# Patient Record
Sex: MALE | Race: WHITE | Employment: FULL TIME | URBAN - METROPOLITAN AREA
[De-identification: names, ages, dates, MRNs, and addresses within clinical notes are randomized per-mention and may not be internally consistent; named-entity substitution may affect disease eponyms.]

---

## 2021-11-03 ENCOUNTER — HOSPITAL ENCOUNTER (INPATIENT)
Facility: HOSPITAL | Age: 51
LOS: 7 days | Discharge: HOME HEALTH | DRG: 392 | End: 2021-11-10
Attending: EMERGENCY MEDICINE | Admitting: INTERNAL MEDICINE
Payer: COMMERCIAL

## 2021-11-03 ENCOUNTER — APPOINTMENT (OUTPATIENT)
Dept: CT IMAGING | Facility: HOSPITAL | Age: 51
DRG: 392 | End: 2021-11-03
Attending: EMERGENCY MEDICINE
Payer: COMMERCIAL

## 2021-11-03 DIAGNOSIS — K57.20 DIVERTICULITIS OF COLON WITH PERFORATION: Primary | ICD-10-CM

## 2021-11-03 PROCEDURE — 99254 IP/OBS CNSLTJ NEW/EST MOD 60: CPT | Performed by: SURGERY

## 2021-11-03 PROCEDURE — 74177 CT ABD & PELVIS W/CONTRAST: CPT | Performed by: EMERGENCY MEDICINE

## 2021-11-03 RX ORDER — KETOROLAC TROMETHAMINE 30 MG/ML
30 INJECTION, SOLUTION INTRAMUSCULAR; INTRAVENOUS ONCE
Status: COMPLETED | OUTPATIENT
Start: 2021-11-03 | End: 2021-11-03

## 2021-11-03 RX ORDER — HYDRALAZINE HYDROCHLORIDE 20 MG/ML
10 INJECTION INTRAMUSCULAR; INTRAVENOUS EVERY 6 HOURS PRN
Status: DISCONTINUED | OUTPATIENT
Start: 2021-11-03 | End: 2021-11-11

## 2021-11-03 RX ORDER — ONDANSETRON 2 MG/ML
4 INJECTION INTRAMUSCULAR; INTRAVENOUS EVERY 6 HOURS PRN
Status: DISCONTINUED | OUTPATIENT
Start: 2021-11-03 | End: 2021-11-11

## 2021-11-03 RX ORDER — DEXTROSE AND SODIUM CHLORIDE 5; .45 G/100ML; G/100ML
INJECTION, SOLUTION INTRAVENOUS CONTINUOUS
Status: DISCONTINUED | OUTPATIENT
Start: 2021-11-03 | End: 2021-11-10

## 2021-11-03 RX ORDER — MORPHINE SULFATE 4 MG/ML
4 INJECTION, SOLUTION INTRAMUSCULAR; INTRAVENOUS EVERY 4 HOURS PRN
Status: DISCONTINUED | OUTPATIENT
Start: 2021-11-03 | End: 2021-11-11

## 2021-11-03 NOTE — ED PROVIDER NOTES
Patient Seen in: [facility] AND [facility] Emergency Department      History   Patient presents with:  Abdomen/Flank Pain    Stated Complaint: Stomach Pain    Subjective:   HPI    64-year-old male with history of hypertension (has not taken medications in years) lower quadrant, no masses, bowel sounds normal  Neurological: Speech normal.  Moving extremities equally x4. Skin: warm and dry, no rashes.   Musculoskeletal: neck is supple non tender        Extremities are symmetrical, full range of motion  Psychiatric: colon with perforation  (primary encounter diagnosis)     Disposition:  Admit  11/3/2021  3:13 pm    Follow-up:  No follow-up provider specified. Medications Prescribed:  There are no discharge medications for this patient.                         [facility]

## 2021-11-03 NOTE — PLAN OF CARE
Problem: Patient Centered Care  Goal: Patient preferences are identified and integrated in the patient's plan of care  Description: Interventions:  - What would you like us to know as we care for you?   - Provide timely, complete, and accurate informatio Progressing  Goal: Maintains adequate nutritional intake (undernourished)  Description: INTERVENTIONS:  - Monitor percentage of each meal consumed  - Identify factors contributing to decreased intake, treat as appropriate  - Assist with meals as needed  -

## 2021-11-03 NOTE — ED QUICK NOTES
Orders for admission, patient is aware of plan and ready to go upstairs. Any questions, please call ED RN Shabbir Ortega  at extension [phone].    Type of COVID test sent: Rapid  COVID Suspicion level:  Low    Titratable drug(s) infusing: none  Rate: na    LOC at time

## 2021-11-03 NOTE — PROGRESS NOTES
[facility] Pharmacy Note: Antimicrobial Weight Based Dose Adjustment for: piperacillin/tazobactam (Maximilian Hargrove)    Akhil Cardoso is a 46year old patient who has been prescribed piperacillin/tazobactam (ZOSYN) 3.375 g x 1.       Wt Readings from Last 6 Encounters:  11/

## 2021-11-04 PROCEDURE — 99232 SBSQ HOSP IP/OBS MODERATE 35: CPT | Performed by: SURGERY

## 2021-11-04 NOTE — CONSULTS
[facility]    PATIENT'S NAME: Dayo Serrano   ATTENDING PHYSICIAN: Bruce Molina MD   CONSULTING PHYSICIAN: Oly Burroughs MD   PATIENT ACCOUNT#:   [id number]    LOCATION:  [facility] [address] #:   [id number]       DATE OF BIRTH:  0 MD  d: 11/04/2021 07:34:17  t: 11/04/2021 08:13:35  [facility] [id number]  /

## 2021-11-04 NOTE — PROGRESS NOTES
[facility]    Progress Note    Miehsa Wade Patient Status:  Inpatient    1970 MRN [id number]   Location [facility] 5SW/SE Attending Kodi Edwards MD   [facility] Day # 1 PCP No primary care provider on file.      Subjectiv be considered to exclude potential underlying malignancy. 2. Indeterminate hepatic lesion. Follow-up MRI of the abdomen with and without contrast is recommended for further assessment.   3. A right adrenal nodule is not completely characterized within the

## 2021-11-04 NOTE — PLAN OF CARE
Problem: Patient Centered Care  Goal: Patient preferences are identified and integrated in the patient's plan of care  Description: Interventions:  - What would you like us to know as we care for you?  From home with family  - Provide timely, complete, an Encourage food from home; allow for food preferences  - Enhance eating environment  Outcome: Progressing  Goal: Achieves appropriate nutritional intake (bariatric)  Description: INTERVENTIONS:  - Monitor for over-consumption  - Identify factors contributin

## 2021-11-04 NOTE — H&P
[facility]    History and Physical    Ronicn Severe Patient Status:  Inpatient    1970 MRN [id number]   Location [facility] 5SW/SE Attending Marianela Hopkins MD   Hosp Day # 0 PCP No primary care provider on file.      Date rhythm. Pulmonary/Chest: Effort normal and breath sounds normal.   Abdominal: Soft. Bowel sounds are normal. There is abdominal tenderness. Neurological: He is oriented to person, place, and time. Psychiatric: He has a normal mood and affect.

## 2021-11-04 NOTE — PAYOR COMM NOTE
--------------  ADMISSION REVIEW     Payor: [employer]OSS [employer]AS  Subscriber #:  [id number]  Authorization Number: Rosana Hernanrebecca    Admit date: 11/3/21  Admit time:  5:27 PM       REVIEW DOCUMENTATION:     ED Provider Notes      ED Provider Notes signed by Pernell Lyles equal and round no pallor or injection  ENT, Mouth: mucous membranes moist  Respiratory: there are no retractions, lungs are clear to auscultation  Cardiovascular: regular rate and rhythm  Gastrointestinal:  abdomen is soft with tenderness to the left lowe Discussed with Dr. Michelle Larry, medicine on-call. Also discussed with Dr. Rabia Ritchie, general surgery on-call.   Admission disposition: 11/3/2021  3:13 PM      Disposition and Plan     Clinical Impression:  Diverticulitis of colon with perforation  (primary encounte to admission. Review of Systems:     Constitutional: Negative. HENT: Negative. Respiratory: Negative. Cardiovascular: Negative. Gastrointestinal: Positive for abdominal pain. Endocrine: Negative. Musculoskeletal: Negative.     Skin: Ne hepatic lesion. Follow-up MRI of the abdomen with and without contrast is recommended for further assessment.   3. A right adrenal nodule is not completely characterized within the parameters of the single phase exam. This could also be better assessed on M Mucosa is dry. NECK:  Supple without lymphadenopathy. Trachea midline. LUNGS:  Clear to auscultation. HEART:  Regular rate and rhythm. ABDOMEN:  Soft. Tenderness in the left flank. No rebound or guarding.   EXTREMITIES:  Without clubbing, cyanosis, guarding     Results:            Lab Results   Component Value Date     WBC 10.4 11/04/2021     HGB 15.5 11/04/2021     HCT 44.7 11/04/2021     .0 11/04/2021     CREATSERUM 1.19 11/04/2021     BUN 16 11/04/2021      (L) 11/04/2021     K 4.2 11 11/3/2021 1426 Given 85 mL Intravenous Camden Ervin L      morphINE sulfate (PF) 4 MG/ML injection 4 mg     Date Action Dose Route User    11/4/2021 1311 Given 4 mg Intravenous Ariel Castaneda RN    11/4/2021 0708 Given 4 mg Intravenous Oscar Huntley RN

## 2021-11-04 NOTE — PROGRESS NOTES
[place] [facility]    Progress Note    Emelina Guzman Patient Status:  Inpatient    1970 MRN [id number]   Location [facility] 5SW/SE Attending Germania Sheehan MD   [address] Day # 1 PCP No primary care provider on file.      Assessment Focal short segment sigmoid colonic diverticulitis with associated micro perforation and extensive surrounding pericolonic inflammation. There is no evidence of mature, loculated, drainable pericolonic abscess formation.  Follow-up is recommended to documen

## 2021-11-04 NOTE — PLAN OF CARE
Problem: Patient Centered Care  Goal: Patient preferences are identified and integrated in the patient's plan of care  Description: Interventions:  - What would you like us to know as we care for you?  I am from home w family  - Provide timely, complete, routine/schedule  - Consider collaborating with pharmacy to review patient's medication profile  Outcome: Progressing  Goal: Maintains adequate nutritional intake (undernourished)  Description: INTERVENTIONS:  - Monitor percentage of each meal consumed  -

## 2021-11-04 NOTE — PROGRESS NOTES
[facility] Pharmacy Note: Antimicrobial Weight Based Dose Adjustment for: piperacillin/tazobactam (Hever Akers)    Braulio Amador is a 46year old patient who has been prescribed piperacillin/tazobactam (ZOSYN) 3.375 g every 6 hours.       Estimated Creatinine Clearance

## 2021-11-05 PROCEDURE — 99232 SBSQ HOSP IP/OBS MODERATE 35: CPT | Performed by: SURGERY

## 2021-11-05 RX ORDER — HYDRALAZINE HYDROCHLORIDE 20 MG/ML
10 INJECTION INTRAMUSCULAR; INTRAVENOUS ONCE
Status: COMPLETED | OUTPATIENT
Start: 2021-11-05 | End: 2021-11-05

## 2021-11-05 RX ORDER — AMLODIPINE BESYLATE 10 MG/1
10 TABLET ORAL DAILY
Status: DISCONTINUED | OUTPATIENT
Start: 2021-11-05 | End: 2021-11-11

## 2021-11-05 RX ORDER — ACETAMINOPHEN 325 MG/1
650 TABLET ORAL EVERY 6 HOURS PRN
Status: DISCONTINUED | OUTPATIENT
Start: 2021-11-05 | End: 2021-11-11

## 2021-11-05 NOTE — PROGRESS NOTES
[place] [facility] - [facility]    Progress Note    Melissa Severe Patient Status:  Inpatient    1970 MRN [id number]   Location [facility] 5SW/SE Attending Marianela Hopkins MD   [facility] Day # 2 PCP No primary care provider on file.      Subjectiv

## 2021-11-05 NOTE — DIETARY NOTE
Brief Nutrition Note         Pt screened at Nutrition risk for wt loss and poor po. Chart reviewed. Visited pt.    NPO x 2 days since admit, Now on Cl liq, taking 100% and tolerating well, denies N/V post prandial.   Pt reports poor po in the past 5 d

## 2021-11-05 NOTE — PLAN OF CARE
Problem: Patient Centered Care  Goal: Patient preferences are identified and integrated in the patient's plan of care  Description: Interventions:  - What would you like us to know as we care for you?  From home with family  - Provide timely, complete, an collaborating with pharmacy to review patient's medication profile  Outcome: Progressing  Goal: Maintains adequate nutritional intake (undernourished)  Description: INTERVENTIONS:  - Monitor percentage of each meal consumed  - Identify factors contributing

## 2021-11-05 NOTE — PROGRESS NOTES
[place] [facility] - [facility]    Progress Note    Akhil Cardoso Patient Status:  Inpatient    1970 MRN [id number]   Location [facility] 5SW/SE Attending Carlos Aguirre MD   [facility] Day # 2 PCP No primary care provider on file.      Assessment associated micro perforation and extensive surrounding pericolonic inflammation. There is no evidence of mature, loculated, drainable pericolonic abscess formation. Follow-up is recommended to document resolution.  Following resolution of acute symptomatolo

## 2021-11-06 PROCEDURE — 99231 SBSQ HOSP IP/OBS SF/LOW 25: CPT | Performed by: SURGERY

## 2021-11-06 RX ORDER — VANCOMYCIN HYDROCHLORIDE 125 MG/1
125 CAPSULE ORAL DAILY
Status: DISCONTINUED | OUTPATIENT
Start: 2021-11-06 | End: 2021-11-11

## 2021-11-06 NOTE — PLAN OF CARE
Patient is resting well overnight. Headache and nausea medication provided relief. No emesis. Discussed BP with , trending down. Call light within reach.      Problem: Patient Centered Care  Goal: Patient preferences are identified and integrated i effectiveness of GI medications  - Encourage mobilization and activity  - Obtain nutritional consult as needed  - Establish a toileting routine/schedule  - Consider collaborating with pharmacy to review patient's medication profile  Outcome: Progressing  G

## 2021-11-06 NOTE — PROGRESS NOTES
[facility]    Progress Note    Waldemar Banerjee Patient Status:  Inpatient    1970 MRN [id number]   Location [facility] 5SW/SE Attending Noelle Ellis MD   [facility] Day # 3 PCP No primary care provider on file.      Subjectiv

## 2021-11-06 NOTE — PROGRESS NOTES
[facility]    Progress Note    Cristofer Wang Patient Status:  Inpatient    1970 MRN [id number]   Location [facility] 5SW/SE Attending Donald Correia MD   [facility] Day # 3 PCP No primary care provider on file.      Assessment 11/04/2021    K 4.2 11/04/2021     11/04/2021    CO2 29.0 11/04/2021     (H) 11/04/2021    CA 9.4 11/04/2021       No results found.             Baldev Ibrahim MD  11/6/2021

## 2021-11-07 ENCOUNTER — APPOINTMENT (OUTPATIENT)
Dept: CT IMAGING | Facility: HOSPITAL | Age: 51
DRG: 392 | End: 2021-11-07
Attending: SURGERY
Payer: COMMERCIAL

## 2021-11-07 PROCEDURE — 74177 CT ABD & PELVIS W/CONTRAST: CPT | Performed by: SURGERY

## 2021-11-07 PROCEDURE — 99231 SBSQ HOSP IP/OBS SF/LOW 25: CPT | Performed by: SURGERY

## 2021-11-07 RX ORDER — LOSARTAN POTASSIUM 50 MG/1
50 TABLET ORAL DAILY
Status: DISCONTINUED | OUTPATIENT
Start: 2021-11-07 | End: 2021-11-11

## 2021-11-07 RX ORDER — ENOXAPARIN SODIUM 100 MG/ML
40 INJECTION SUBCUTANEOUS DAILY
Status: DISCONTINUED | OUTPATIENT
Start: 2021-11-07 | End: 2021-11-11

## 2021-11-07 NOTE — PROGRESS NOTES
[facility]    Progress Note    Hattie Wild Patient Status:  Inpatient    1970 MRN [id number]   Location [facility] 5SW/SE Attending Michelle Mitchell MD   [facility] Day # 4 PCP No primary care provider on file.      Assessment Exam: Afeb, mild HTN  Abd protuberant, some distention today, mildly tender LLQ, no diffuse peritonitis.     Results:     Lab Results   Component Value Date    WBC 10.4 11/04/2021    HGB 15.5 11/04/2021    HCT 44.7 11/04/2021    .0 11/04/2021

## 2021-11-07 NOTE — PROGRESS NOTES
[place] [facility] - [facility]    Progress Note    Francisco Vogt Patient Status:  Inpatient    1970 MRN [id number]   Location [facility] 5SW/SE Attending David Singh MD   [facility] Day # 4 PCP No primary care provider on file.      Subjectiv well-defined/drainable fluid collection/abscess. No significant free intraperitoneal air. There are punctate foci of gas along the posterior margin of the mid descending colon, some of which appear new or more conspicuous since prior.   These are favored

## 2021-11-07 NOTE — PLAN OF CARE
Problem: GASTROINTESTINAL - ADULT  Goal: Minimal or absence of nausea and vomiting  Description: INTERVENTIONS:  - Maintain adequate hydration with IV or PO as ordered and tolerated  - Nasogastric tube to low intermittent suction as ordered  - Evaluate e Monitor for opioid side effects  - Notify MD/LIP if interventions unsuccessful or patient reports new pain  - Anticipate increased pain with activity and pre-medicate as appropriate  Outcome: Progressing     Vital signs stable on room air.  Zofran given PRN

## 2021-11-07 NOTE — PLAN OF CARE
Problem: Patient Centered Care  Goal: Patient preferences are identified and integrated in the patient's plan of care  Description: Interventions:  - What would you like us to know as we care for you?  From home with family  - Provide timely, complete, an Progressing  Goal: Maintains or returns to baseline bowel function  Description: INTERVENTIONS:  - Assess bowel function  - Maintain adequate hydration with IV or PO as ordered and tolerated  - Evaluate effectiveness of GI medications  - Encourage mobiliza increased pain with activity and pre-medicate as appropriate  Outcome: Progressing     Problem: SKIN/TISSUE INTEGRITY - ADULT  Goal: Skin integrity remains intact  Description: INTERVENTIONS  - Assess and document risk factors for pressure ulcer developmen

## 2021-11-08 PROCEDURE — 99232 SBSQ HOSP IP/OBS MODERATE 35: CPT | Performed by: SURGERY

## 2021-11-08 RX ORDER — PANTOPRAZOLE SODIUM 40 MG/1
40 TABLET, DELAYED RELEASE ORAL
Status: DISCONTINUED | OUTPATIENT
Start: 2021-11-08 | End: 2021-11-11

## 2021-11-08 NOTE — PROGRESS NOTES
[facility]    [facility] Note    Francisco Vogt Patient Status:  Inpatient    1970 MRN [id number]   Location [facility] 5SW/SE Attending David Singh MD   Hosp Day # 5 PCP No primary care provider on file.      Assessment 34 11/07/2021    ALT 79 (H) 11/07/2021    INR 1.19 11/07/2021       CT ABDOMEN+PELVIS(CONTRAST ONLY)(CPT=74177)    Result Date: 11/7/2021  CONCLUSION:  1. Ongoing acute diverticulitis involving the mid descending colon.   Surrounding inflammatory changes an

## 2021-11-08 NOTE — PLAN OF CARE
Patient is resting in bed, A&O x4, VSS, and pain is being managed with tylenol PRN. Compliant with NPO status and has no complaints of nausea overnight. IVF and and antibiotics infusing. Up independently and voiding. + gas and + burping overnight.  Bed is i ADULT  Goal: Minimal or absence of nausea and vomiting  Description: INTERVENTIONS:  - Maintain adequate hydration with IV or PO as ordered and tolerated  - Nasogastric tube to low intermittent suction as ordered  - Evaluate effectiveness of ordered antiem pain scale  - Administer analgesics based on type and severity of pain and evaluate response  - Implement non-pharmacological measures as appropriate and evaluate response  - Consider cultural and social influences on pain and pain management  - Manage/all

## 2021-11-08 NOTE — PLAN OF CARE
Problem: GASTROINTESTINAL - ADULT  Goal: Minimal or absence of nausea and vomiting  Description: INTERVENTIONS:  - Maintain adequate hydration with IV or PO as ordered and tolerated  - Nasogastric tube to low intermittent suction as ordered  - Evaluate e needed  - Establish a toileting routine/schedule  - Consider collaborating with pharmacy to review patient's medication profile  Outcome: Not Progressing  Goal: Maintains adequate nutritional intake (undernourished)  Description: INTERVENTIONS:  - Monitor

## 2021-11-08 NOTE — PAYOR COMM NOTE
--------------  CONTINUED STAY REVIEW----REQUESTING ADDITIONAL DAYS 11/6 11/7 11/8    Payor: [facility]  Subscriber #:  [id number]  Authorization Number: [id number]    Admit date: 11/3/21  Admit time:  5:27 PM    Admitting Physician: Randa Tolentino MD Respiratory: Negative. Cardiovascular: Negative. Gastrointestinal: Negative. Endocrine: Negative. Genitourinary: Negative. Musculoskeletal: Negative. Skin: Negative. Hematological: Negative.     Psychiatric/Behavioral: Negative.      likely relate to minimal micro perforation. Post treatment colonoscopy correlation is suggested, if not recently performed. 2. Fatty liver. Stable indeterminate well-circumscribed 1.4 cm right hepatic lobe lesion.  3. Stable size of well-circumscribed rig Psychiatric: He has a normal mood and affect.         Results:         Lab Results   Component Value Date     WBC 5.5 11/08/2021     HGB 15.9 11/08/2021     HCT 44.8 11/08/2021     .0 11/08/2021     CREATSERUM 1.06 11/08/2021     BUN 10 11/08/2021    Assessment & Plan:     Diverticulitis of colon with perforation  Pt feeling better today  Pain meds and IV zosyn  HTN BP improved on Norvasc and losartan        MEDICATIONS ADMINISTERED IN LAST 1 DAY:  acetaminophen (TYLENOL) tab 650 mg     Date Actio Dose Route User    11/8/2021 5487 Given 125 mg Oral Henrietta Mccartney RN          Vitals (last day)     Date/Time Temp Pulse Resp BP SpO2 Weight O2 Device O2 Flow Rate (L/min) [facility]    11/08/21 1541 98.3 °F (36.8 °C) 96 18 130/97 93 % — None (Room air) — MM

## 2021-11-08 NOTE — PROGRESS NOTES
[facility]    Progress Note    Cristofer Viveros Patient Status:  Inpatient    1970 MRN [id number]   Location [facility] 5SW/SE Attending Jeremiah Faustin MD   Hosp Day # 5 PCP No primary care provider on file.      Subjectiv well-defined/drainable fluid collection/abscess. No significant free intraperitoneal air. There are punctate foci of gas along the posterior margin of the mid descending colon, some of which appear new or more conspicuous since prior.   These are favored

## 2021-11-09 ENCOUNTER — APPOINTMENT (OUTPATIENT)
Dept: PICC SERVICES | Facility: HOSPITAL | Age: 51
DRG: 392 | End: 2021-11-09
Attending: SURGERY
Payer: COMMERCIAL

## 2021-11-09 PROCEDURE — 99232 SBSQ HOSP IP/OBS MODERATE 35: CPT | Performed by: SURGERY

## 2021-11-09 PROCEDURE — 05HC33Z INSERTION OF INFUSION DEVICE INTO LEFT BASILIC VEIN, PERCUTANEOUS APPROACH: ICD-10-PCS | Performed by: SURGERY

## 2021-11-09 RX ORDER — PANTOPRAZOLE SODIUM 40 MG/1
40 TABLET, DELAYED RELEASE ORAL
Qty: 30 TABLET | Refills: 0 | Status: SHIPPED | OUTPATIENT
Start: 2021-11-10 | End: 2021-12-27 | Stop reason: ALTCHOICE

## 2021-11-09 RX ORDER — LOSARTAN POTASSIUM 50 MG/1
50 TABLET ORAL DAILY
Qty: 30 TABLET | Refills: 0 | Status: SHIPPED | OUTPATIENT
Start: 2021-11-10

## 2021-11-09 RX ORDER — LIDOCAINE HYDROCHLORIDE 10 MG/ML
5 INJECTION, SOLUTION EPIDURAL; INFILTRATION; INTRACAUDAL; PERINEURAL ONCE
Status: COMPLETED | OUTPATIENT
Start: 2021-11-09 | End: 2021-11-09

## 2021-11-09 RX ORDER — AMLODIPINE BESYLATE 10 MG/1
10 TABLET ORAL DAILY
Qty: 30 TABLET | Refills: 0 | Status: SHIPPED | OUTPATIENT
Start: 2021-11-10

## 2021-11-09 RX ORDER — PIPERACILLIN SODIUM, TAZOBACTAM SODIUM 4; .5 G/20ML; G/20ML
4.5 INJECTION, POWDER, LYOPHILIZED, FOR SOLUTION INTRAVENOUS 4 TIMES DAILY
Qty: 126 G | Refills: 0 | Status: SHIPPED | OUTPATIENT
Start: 2021-11-09 | End: 2021-11-10

## 2021-11-09 NOTE — DISCHARGE SUMMARY
[place] [facility] - [facility]    Discharge Summary    Emelina Guzman Patient Status:  Inpatient    1970 MRN [id number]   Location [facility] 5SW/SE Attending Germania Sheehan MD   Hosp Day # 6 PCP No primary care provider on file.      Date of daily.    pantoprazole 40 MG Oral Tab EC  Take 1 tablet (40 mg total) by mouth every morning before breakfast.              Discharge Diet: As tolerated    Discharge Activity: As tolerated    Follow up:       Follow-up Information     Anirudh Fairchild MD In 2

## 2021-11-09 NOTE — PROGRESS NOTES
[facility]    Progress Note    Braulio Amador Patient Status:  Inpatient    1970 MRN [id number]   Location [facility] 5SW/SE Attending Judy Jonas MD   Hosp Day # 6 PCP No primary care provider on file.      Assessment 11/07/2021    AST 34 11/07/2021    ALT 79 (H) 11/07/2021    INR 1.19 11/07/2021       No results found.             Tanvi Franco MD  11/9/2021

## 2021-11-09 NOTE — CM/SW NOTE
SW initiated self referral for outpatient antibiotics. Patient was discussed during rounds and plan is for outpatient IV antibiotics at [facility]. SW met with patient at bedside to discuss discharge planning. Patient confirmed address on facesheet.  Patient re met with patient and provided patient with [facility] list of available HH. Patient reports that choice is [facility]. Reserved via Aidin and notified of DC w/ need for [facility] tomorrow.      SW provided patient with Aidin list of available infusion companies and cov

## 2021-11-09 NOTE — PLAN OF CARE
Problem: Patient Centered Care  Goal: Patient preferences are identified and integrated in the patient's plan of care  Description: Interventions:  - What would you like us to know as we care for you?  From home with family  - Provide timely, complete, an Progressing  Goal: Maintains or returns to baseline bowel function  Description: INTERVENTIONS:  - Assess bowel function  - Maintain adequate hydration with IV or PO as ordered and tolerated  - Evaluate effectiveness of GI medications  - Encourage mobiliza

## 2021-11-10 VITALS
DIASTOLIC BLOOD PRESSURE: 74 MMHG | WEIGHT: 290 LBS | HEIGHT: 76 IN | TEMPERATURE: 98 F | BODY MASS INDEX: 35.31 KG/M2 | SYSTOLIC BLOOD PRESSURE: 145 MMHG | RESPIRATION RATE: 20 BRPM | OXYGEN SATURATION: 96 % | HEART RATE: 85 BPM

## 2021-11-10 RX ORDER — PIPERACILLIN SODIUM, TAZOBACTAM SODIUM 4; .5 G/20ML; G/20ML
4.5 INJECTION, POWDER, LYOPHILIZED, FOR SOLUTION INTRAVENOUS 3 TIMES DAILY
Qty: 94.5 G | Refills: 0 | Status: SHIPPED | OUTPATIENT
Start: 2021-11-10 | End: 2021-11-17

## 2021-11-10 NOTE — DIETARY NOTE
Education Nutrition Note    Provided pt diet instructions and hand out on Soft/Low fiber diet for Diverticulitis,  for 2 weeks or per MD advise. Pt verbalized understanding. Contact information provided if with questions.      Altagracia Day RD, LDN, CNSC  Cl

## 2021-11-10 NOTE — PLAN OF CARE
Problem: Patient Centered Care  Goal: Patient preferences are identified and integrated in the patient's plan of care  Description: Interventions:  - What would you like us to know as we care for you?  From home with family  - Provide timely, complete, an Progressing  Goal: Maintains or returns to baseline bowel function  Description: INTERVENTIONS:  - Assess bowel function  - Maintain adequate hydration with IV or PO as ordered and tolerated  - Evaluate effectiveness of GI medications  - Encourage mobiliza response  - Implement non-pharmacological measures as appropriate and evaluate response  - Consider cultural and social influences on pain and pain management  - Manage/alleviate anxiety  - Utilize distraction and/or relaxation techniques  - Monitor for op

## 2021-11-10 NOTE — PAYOR COMM NOTE
--------------  CONTINUED STAY REVIEW----REQUESTING ADDITIONAL DAY 11/9      Payor: [employer]AS  Subscriber #:  [id number]  Authorization Number: [id number]    Admit date: 11/3/21  Admit time:  5:27 PM    Admitting Physician: Jeremiah Faustin MD  Attendin 134 (H) 11/08/2021     CA 9.3 11/08/2021     ALB 3.6 11/07/2021     ALKPHO 48 11/07/2021     BILT 1.1 11/07/2021     TP 7.9 11/07/2021     AST 34 11/07/2021     ALT 79 (H) 11/07/2021     INR 1.19 11/07/2021         No results found.               Lior Dorsey Vitals (last day)     Date/Time Temp Pulse Resp BP SpO2 Weight O2 Device O2 Flow Rate (L/min) Who    11/10/21 1425 98 °F (36.7 °C) 99 20 145/74 95 % — None (Room air) — MG    11/10/21 0812 97.8 °F (36.6 °C) 93 18 150/84 96 % — None (Room air) — MG

## 2021-11-10 NOTE — PROGRESS NOTES
[facility]    Progress Note    Marianela Drew Patient Status:  Inpatient    1970 MRN [id number]   Location [facility] 5SW/SE Attending Jimenez Sullivan MD   Hosp Day # 7 PCP No primary care provider on file.      Subjectiv

## 2021-11-10 NOTE — PLAN OF CARE
Problem: Patient Centered Care  Goal: Patient preferences are identified and integrated in the patient's plan of care  Description: Interventions:  - What would you like us to know as we care for you?  From home with family  - Provide timely, complete, an Not Progressing  Goal: Patient/Family Short Term Goal  Description: Patient's Short Term Goal: to have some food again.     Interventions:   - Monitor vital signs  - Monitor appropriate labs  - Pain management  - Administer medications per order  - Follow M

## 2021-11-11 NOTE — PLAN OF CARE
Korisyn completed, midline flushed and wrapped with ACE per pt request. Discharge instructions reviewed with pt, no further questions. Pt is being discharged per wheelchair accompanied by PCT and brother, Murtaza Clarke.

## 2021-11-11 NOTE — PLAN OF CARE
Problem: Patient Centered Care  Goal: Patient preferences are identified and integrated in the patient's plan of care  Description: Interventions:  - What would you like us to know as we care for you?  From home with family  - Provide timely, complete, an VORB FROM DR Rosalina Sierra REFILL ACYCLOVIR (ZOVIRAX) 400MG TAB TAKE 1 TAB BY MOUTH BID D-60 R 5 Evaluate fluid balance  Outcome: Adequate for Discharge  Goal: Maintains or returns to baseline bowel function  Description: INTERVENTIONS:  - Assess bowel function  - Maintain adequate hydration with IV or PO as ordered and tolerated  - Evaluate effective pain scale  - Administer analgesics based on type and severity of pain and evaluate response  - Implement non-pharmacological measures as appropriate and evaluate response  - Consider cultural and social influences on pain and pain management  - Manage/all

## 2021-11-11 NOTE — PAYOR COMM NOTE
--------------  DISCHARGE REVIEW    Payor: [employer]  Subscriber #:  [id number]  Authorization Number: Zeus Boudreaux    Admit date: 11/3/21  Admit time:   5:27 PM  Discharge Date: 11/10/2021 10:38 PM     Admitting Physician: Mane Shrestha MD  Attending symmetric  Skin: Skin color, texture, turgor normal. No rashes or lesions  Neurologic: Grossly normal    Hospital Course: Pt admitted for acute diverticulitis with microperforation. Started on abx, seen by surgery. BP high started on meds.  Patient tolerati

## 2021-11-22 ENCOUNTER — OFFICE VISIT (OUTPATIENT)
Dept: SURGERY | Facility: CLINIC | Age: 51
End: 2021-11-22
Payer: COMMERCIAL

## 2021-11-22 DIAGNOSIS — K40.20 BILATERAL INGUINAL HERNIA WITHOUT OBSTRUCTION OR GANGRENE, RECURRENCE NOT SPECIFIED: Primary | ICD-10-CM

## 2021-11-22 PROCEDURE — 99213 OFFICE O/P EST LOW 20 MIN: CPT | Performed by: SURGERY

## 2021-11-22 NOTE — PROGRESS NOTES
Established Patient Follow-up      11/22/2021    Randy Lawson 46year old      HPI  Patient presents with:  Diverticulitis: Pt states her for follow up from hospital.  Pt states lower abdominal pain has subsided. Pt cont. to eat a soft diet.   Pt bm's ar

## 2021-11-24 ENCOUNTER — TELEPHONE (OUTPATIENT)
Dept: SURGERY | Facility: CLINIC | Age: 51
End: 2021-11-24

## 2021-11-26 NOTE — TELEPHONE ENCOUNTER
Pt calling to see if he still should be working since surgery is 12/30 and the type of work he does please advise

## 2021-11-26 NOTE — TELEPHONE ENCOUNTER
Called patient back. 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 He is wondering if he should be working prior to his surgery? He forgot to discuss with Dr. Srinivasan Escamilla during consultation regarding surgery.

## 2021-11-29 NOTE — TELEPHONE ENCOUNTER
Per Dr. Dakota Tolentino: Patient is okay to work. Jasmin Ok he has any pain or discomfort he should notify me . Patient is applying for FMLA.

## 2021-12-09 ENCOUNTER — TELEPHONE (OUTPATIENT)
Dept: SURGERY | Facility: CLINIC | Age: 51
End: 2021-12-09

## 2021-12-09 NOTE — TELEPHONE ENCOUNTER
Patient came into the office 12-9-2021, required letter for work regarding restrictions before surgery. Patient given the letter and will give to his HR. Patient scheduled for surg with MD NORBERT 12-30-21 @ [facility]. Ar TOUSSAINT MD approved (verbal)   ALTAF

## 2021-12-22 ENCOUNTER — TELEPHONE (OUTPATIENT)
Dept: SURGERY | Facility: CLINIC | Age: 51
End: 2021-12-22

## 2021-12-22 NOTE — TELEPHONE ENCOUNTER
Estefani requesting to speak to Nurse Kalpesh Law regarding Pre--surgical consent forms. I tried to reach Nurse at ext. [phone], but no answer.

## 2021-12-27 ENCOUNTER — LAB ENCOUNTER (OUTPATIENT)
Dept: LAB | Facility: HOSPITAL | Age: 51
End: 2021-12-27
Attending: SURGERY
Payer: COMMERCIAL

## 2021-12-27 DIAGNOSIS — Z01.818 PREOP TESTING: ICD-10-CM

## 2021-12-30 ENCOUNTER — ANESTHESIA (OUTPATIENT)
Dept: SURGERY | Facility: HOSPITAL | Age: 51
End: 2021-12-30
Payer: COMMERCIAL

## 2021-12-30 ENCOUNTER — HOSPITAL ENCOUNTER (OUTPATIENT)
Facility: HOSPITAL | Age: 51
Setting detail: HOSPITAL OUTPATIENT SURGERY
Discharge: HOME | End: 2021-12-30
Attending: SURGERY | Admitting: SURGERY
Payer: COMMERCIAL

## 2021-12-30 ENCOUNTER — ANESTHESIA EVENT (OUTPATIENT)
Dept: SURGERY | Facility: HOSPITAL | Age: 51
End: 2021-12-30
Payer: COMMERCIAL

## 2021-12-30 VITALS
RESPIRATION RATE: 16 BRPM | BODY MASS INDEX: 33.71 KG/M2 | HEIGHT: 76 IN | WEIGHT: 276.81 LBS | DIASTOLIC BLOOD PRESSURE: 86 MMHG | HEART RATE: 62 BPM | TEMPERATURE: 98 F | OXYGEN SATURATION: 95 % | SYSTOLIC BLOOD PRESSURE: 137 MMHG

## 2021-12-30 DIAGNOSIS — Z01.818 PREOP TESTING: Primary | ICD-10-CM

## 2021-12-30 DIAGNOSIS — K40.20 BILATERAL INGUINAL HERNIA WITHOUT OBSTRUCTION OR GANGRENE, RECURRENCE NOT SPECIFIED: ICD-10-CM

## 2021-12-30 PROCEDURE — 49650 LAP ING HERNIA REPAIR INIT: CPT | Performed by: SURGERY

## 2021-12-30 PROCEDURE — 8E0W4CZ ROBOTIC ASSISTED PROCEDURE OF TRUNK REGION, PERCUTANEOUS ENDOSCOPIC APPROACH: ICD-10-PCS | Performed by: SURGERY

## 2021-12-30 PROCEDURE — 0YUA4JZ SUPPLEMENT BILATERAL INGUINAL REGION WITH SYNTHETIC SUBSTITUTE, PERCUTANEOUS ENDOSCOPIC APPROACH: ICD-10-PCS | Performed by: SURGERY

## 2021-12-30 DEVICE — VENTRALIGHT ST MESH WITH ECHO PS POSITONING SYSTEM
Type: IMPLANTABLE DEVICE | Site: UMBILICAL | Status: FUNCTIONAL
Brand: VENTRALIGHT ST MESH WITH ECHO PS POSITONING SYSTEM

## 2021-12-30 DEVICE — PROGRIP MESH: Type: IMPLANTABLE DEVICE | Site: INGUINAL | Status: FUNCTIONAL

## 2021-12-30 RX ORDER — DEXAMETHASONE SODIUM PHOSPHATE 4 MG/ML
VIAL (ML) INJECTION AS NEEDED
Status: DISCONTINUED | OUTPATIENT
Start: 2021-12-30 | End: 2021-12-30 | Stop reason: SURG

## 2021-12-30 RX ORDER — HYDROCODONE BITARTRATE AND ACETAMINOPHEN 5; 325 MG/1; MG/1
2 TABLET ORAL AS NEEDED
Status: DISCONTINUED | OUTPATIENT
Start: 2021-12-30 | End: 2021-12-30

## 2021-12-30 RX ORDER — HALOPERIDOL 5 MG/ML
0.25 INJECTION INTRAMUSCULAR ONCE AS NEEDED
Status: DISCONTINUED | OUTPATIENT
Start: 2021-12-30 | End: 2021-12-30

## 2021-12-30 RX ORDER — NALOXONE HYDROCHLORIDE 0.4 MG/ML
80 INJECTION, SOLUTION INTRAMUSCULAR; INTRAVENOUS; SUBCUTANEOUS AS NEEDED
Status: DISCONTINUED | OUTPATIENT
Start: 2021-12-30 | End: 2021-12-30

## 2021-12-30 RX ORDER — HYDROMORPHONE HYDROCHLORIDE 1 MG/ML
0.6 INJECTION, SOLUTION INTRAMUSCULAR; INTRAVENOUS; SUBCUTANEOUS EVERY 5 MIN PRN
Status: DISCONTINUED | OUTPATIENT
Start: 2021-12-30 | End: 2021-12-30

## 2021-12-30 RX ORDER — HYDROMORPHONE HYDROCHLORIDE 1 MG/ML
0.4 INJECTION, SOLUTION INTRAMUSCULAR; INTRAVENOUS; SUBCUTANEOUS EVERY 5 MIN PRN
Status: DISCONTINUED | OUTPATIENT
Start: 2021-12-30 | End: 2021-12-30

## 2021-12-30 RX ORDER — MORPHINE SULFATE 4 MG/ML
4 INJECTION, SOLUTION INTRAMUSCULAR; INTRAVENOUS EVERY 10 MIN PRN
Status: DISCONTINUED | OUTPATIENT
Start: 2021-12-30 | End: 2021-12-30

## 2021-12-30 RX ORDER — ROCURONIUM BROMIDE 10 MG/ML
INJECTION, SOLUTION INTRAVENOUS AS NEEDED
Status: DISCONTINUED | OUTPATIENT
Start: 2021-12-30 | End: 2021-12-30 | Stop reason: SURG

## 2021-12-30 RX ORDER — LIDOCAINE HYDROCHLORIDE 10 MG/ML
INJECTION, SOLUTION EPIDURAL; INFILTRATION; INTRACAUDAL; PERINEURAL AS NEEDED
Status: DISCONTINUED | OUTPATIENT
Start: 2021-12-30 | End: 2021-12-30 | Stop reason: SURG

## 2021-12-30 RX ORDER — HYDROCODONE BITARTRATE AND ACETAMINOPHEN 5; 325 MG/1; MG/1
1 TABLET ORAL AS NEEDED
Status: DISCONTINUED | OUTPATIENT
Start: 2021-12-30 | End: 2021-12-30

## 2021-12-30 RX ORDER — FAMOTIDINE 20 MG/1
20 TABLET ORAL ONCE
Status: COMPLETED | OUTPATIENT
Start: 2021-12-30 | End: 2021-12-30

## 2021-12-30 RX ORDER — ONDANSETRON 2 MG/ML
4 INJECTION INTRAMUSCULAR; INTRAVENOUS ONCE AS NEEDED
Status: COMPLETED | OUTPATIENT
Start: 2021-12-30 | End: 2021-12-30

## 2021-12-30 RX ORDER — METOCLOPRAMIDE 10 MG/1
10 TABLET ORAL ONCE
Status: COMPLETED | OUTPATIENT
Start: 2021-12-30 | End: 2021-12-30

## 2021-12-30 RX ORDER — HYDROMORPHONE HYDROCHLORIDE 1 MG/ML
0.2 INJECTION, SOLUTION INTRAMUSCULAR; INTRAVENOUS; SUBCUTANEOUS EVERY 5 MIN PRN
Status: DISCONTINUED | OUTPATIENT
Start: 2021-12-30 | End: 2021-12-30

## 2021-12-30 RX ORDER — ACETAMINOPHEN 500 MG
1000 TABLET ORAL ONCE
Status: COMPLETED | OUTPATIENT
Start: 2021-12-30 | End: 2021-12-30

## 2021-12-30 RX ORDER — HYDROCODONE BITARTRATE AND ACETAMINOPHEN 5; 325 MG/1; MG/1
1 TABLET ORAL EVERY 4 HOURS PRN
Qty: 20 TABLET | Refills: 0 | Status: SHIPPED | OUTPATIENT
Start: 2021-12-30

## 2021-12-30 RX ORDER — SODIUM CHLORIDE, SODIUM LACTATE, POTASSIUM CHLORIDE, CALCIUM CHLORIDE 600; 310; 30; 20 MG/100ML; MG/100ML; MG/100ML; MG/100ML
INJECTION, SOLUTION INTRAVENOUS CONTINUOUS
Status: DISCONTINUED | OUTPATIENT
Start: 2021-12-30 | End: 2021-12-30

## 2021-12-30 RX ORDER — MORPHINE SULFATE 4 MG/ML
2 INJECTION, SOLUTION INTRAMUSCULAR; INTRAVENOUS EVERY 10 MIN PRN
Status: DISCONTINUED | OUTPATIENT
Start: 2021-12-30 | End: 2021-12-30

## 2021-12-30 RX ORDER — PROCHLORPERAZINE EDISYLATE 5 MG/ML
5 INJECTION INTRAMUSCULAR; INTRAVENOUS ONCE AS NEEDED
Status: DISCONTINUED | OUTPATIENT
Start: 2021-12-30 | End: 2021-12-30

## 2021-12-30 RX ORDER — MORPHINE SULFATE 10 MG/ML
6 INJECTION, SOLUTION INTRAMUSCULAR; INTRAVENOUS EVERY 10 MIN PRN
Status: DISCONTINUED | OUTPATIENT
Start: 2021-12-30 | End: 2021-12-30

## 2021-12-30 RX ORDER — ONDANSETRON 2 MG/ML
INJECTION INTRAMUSCULAR; INTRAVENOUS AS NEEDED
Status: DISCONTINUED | OUTPATIENT
Start: 2021-12-30 | End: 2021-12-30 | Stop reason: SURG

## 2021-12-30 RX ORDER — GLYCOPYRROLATE 0.2 MG/ML
INJECTION, SOLUTION INTRAMUSCULAR; INTRAVENOUS AS NEEDED
Status: DISCONTINUED | OUTPATIENT
Start: 2021-12-30 | End: 2021-12-30 | Stop reason: SURG

## 2021-12-30 RX ORDER — LABETALOL HYDROCHLORIDE 5 MG/ML
10 INJECTION, SOLUTION INTRAVENOUS ONCE
Status: COMPLETED | OUTPATIENT
Start: 2021-12-30 | End: 2021-12-30

## 2021-12-30 RX ORDER — DOCUSATE SODIUM 100 MG/1
100 CAPSULE, LIQUID FILLED ORAL 2 TIMES DAILY
Qty: 30 CAPSULE | Refills: 0 | Status: SHIPPED | OUTPATIENT
Start: 2021-12-30

## 2021-12-30 RX ORDER — IBUPROFEN 600 MG/1
600 TABLET ORAL EVERY 6 HOURS PRN
Qty: 15 TABLET | Refills: 1 | Status: SHIPPED | OUTPATIENT
Start: 2021-12-30 | End: 2022-01-06

## 2021-12-30 RX ORDER — BUPIVACAINE HYDROCHLORIDE 2.5 MG/ML
INJECTION, SOLUTION EPIDURAL; INFILTRATION; INTRACAUDAL AS NEEDED
Status: DISCONTINUED | OUTPATIENT
Start: 2021-12-30 | End: 2021-12-30 | Stop reason: HOSPADM

## 2021-12-30 RX ORDER — SODIUM CHLORIDE 9 MG/ML
INJECTION, SOLUTION INTRAVENOUS CONTINUOUS PRN
Status: DISCONTINUED | OUTPATIENT
Start: 2021-12-30 | End: 2021-12-30 | Stop reason: SURG

## 2021-12-30 RX ADMIN — DEXAMETHASONE SODIUM PHOSPHATE 4 MG: 4 MG/ML VIAL (ML) INJECTION at 08:18:00

## 2021-12-30 RX ADMIN — ROCURONIUM BROMIDE 20 MG: 10 INJECTION, SOLUTION INTRAVENOUS at 08:59:00

## 2021-12-30 RX ADMIN — SODIUM CHLORIDE: 9 INJECTION, SOLUTION INTRAVENOUS at 08:26:00

## 2021-12-30 RX ADMIN — LIDOCAINE HYDROCHLORIDE 50 MG: 10 INJECTION, SOLUTION EPIDURAL; INFILTRATION; INTRACAUDAL; PERINEURAL at 08:16:00

## 2021-12-30 RX ADMIN — ROCURONIUM BROMIDE 40 MG: 10 INJECTION, SOLUTION INTRAVENOUS at 08:23:00

## 2021-12-30 RX ADMIN — SODIUM CHLORIDE, SODIUM LACTATE, POTASSIUM CHLORIDE, CALCIUM CHLORIDE: 600; 310; 30; 20 INJECTION, SOLUTION INTRAVENOUS at 11:48:00

## 2021-12-30 RX ADMIN — ONDANSETRON 4 MG: 2 INJECTION INTRAMUSCULAR; INTRAVENOUS at 08:18:00

## 2021-12-30 RX ADMIN — GLYCOPYRROLATE 0.2 MG: 0.2 INJECTION, SOLUTION INTRAMUSCULAR; INTRAVENOUS at 08:18:00

## 2021-12-30 NOTE — H&P
Denny Jang MD   Physician   Internal Medicine   H&P      Signed                    Signed              [facility]     History and Physical           Stephanie Artis Patient Status:  Inpatient    1970 MRN [id number]   [employer] Pupils are equal, round, and reactive to light. Cardiovascular: Normal rate and regular rhythm. Pulmonary/Chest: Effort normal and breath sounds normal.   Abdominal: Soft. Bowel sounds are normal. There is abdominal tenderness.    Neurological: He is o heparin  Condition D/W brother at length in ER           Patient found to have bilateral inguinal hernia and umbilical hernia on CT scan. Both are reducible.   He agrees to repair.   YEIMY harris                                                               N

## 2021-12-30 NOTE — OPERATIVE REPORT
[facility]    PATIENT'S NAME: Demetrio Gillette   ATTENDING PHYSICIAN: Emilie Banegas MD   OPERATING PHYSICIAN: Emilie Banegas MD   PATIENT ACCOUNT#:   [de-identified]    LOCATION:  [facility] PACU [address] [address]  MEDICAL RECORD #:   [id number]       DATE OF BI secured into place using secure strap. The balloon is removed. All hemostasis assured. Trocars removed. Fascia closed with 0 Vicryl. Skin closed with 3-0 Monocryl, Benzoin, Steri-Strips.       Dictated By Darlene Carrillo MD  d: 12/30/2021 09:55:25  t: 15

## 2021-12-30 NOTE — ADDENDUM NOTE
Addendum  created 12/30/21 1148 by Micheline Duffy MD    Intraprocedure Event edited, Intraprocedure Meds edited

## 2021-12-30 NOTE — INTERVAL H&P NOTE
Pre-op Diagnosis: Bilateral inguinal hernia without obstruction or gangrene, recurrence not specified [K40.20]    The above referenced H&P was reviewed by Kenny Bryant MD on 12/30/2021, the patient was examined and no significant changes have occurred in

## 2021-12-30 NOTE — ANESTHESIA PROCEDURE NOTES
Airway  Date/Time: 12/30/2021 8:22 AM  Urgency: Elective      General Information and Staff    Patient location during procedure: OR  Anesthesiologist: Christine Romero MD  Performed: anesthesiologist     Indications and Patient Condition  Indications for

## 2021-12-30 NOTE — ANESTHESIA PREPROCEDURE EVALUATION
Anesthesia PreOp Note    HPI:     Radha Lockwood is a 46year old male who presents for preoperative consultation requested by: Ross Verma MD    Date of Surgery: 12/30/2021    Procedure(s):  ROBOTIC Bilateral Inguinal and Umbilical Hernia repair  Ind Smokeless tobacco: Never Used    Vaping Use      Vaping Use: Never used    Substance and Sexual Activity      Alcohol use: Not Currently      Drug use: Not Currently        Types: Cannabis      Sexual activity: Not on file    Other Topics      Concerns: negative ROS     Endo/Other - negative ROS   Abdominal  - normal exam               Anesthesia Plan:   ASA:  2  Plan:   General  Airway:  ETT and Video laryngoscope  Post-op Pain Management: IV analgesics  Informed Consent Plan and Risks Discussed With:  SARAN

## 2021-12-30 NOTE — ANESTHESIA PROCEDURE NOTES
Peripheral IV  Date/Time: 12/30/2021 8:20 AM  Inserted by: Kayce Wilder MD    Placement  Needle size: 20 G  Laterality: right  Location: hand  Site prep: alcohol  Attempts: 1

## 2021-12-30 NOTE — ANESTHESIA POSTPROCEDURE EVALUATION
Patient: Giacomo Jacobo    Procedure Summary     Date: 12/30/21 Room / Location: [address] [place] [address] MAIN OR 07 / [address] [place] [address] OR    Anesthesia Start: 2452 Anesthesia Stop: 8577    Procedure: ROBOTIC Bilateral Inguinal and Umbilical Hernia repair (N/A Abdomen) Diagnosis:

## 2022-01-10 ENCOUNTER — OFFICE VISIT (OUTPATIENT)
Dept: SURGERY | Facility: CLINIC | Age: 52
End: 2022-01-10
Payer: COMMERCIAL

## 2022-01-10 DIAGNOSIS — Z98.890 POST-OPERATIVE STATE: Primary | ICD-10-CM

## 2022-01-10 PROCEDURE — 99024 POSTOP FOLLOW-UP VISIT: CPT | Performed by: SURGERY

## 2022-01-10 NOTE — PROGRESS NOTES
Postoperative Patient Follow-up      1/10/2022    ADRIANA Blantonnakia Villalba is a 46year old male post robotic repair of bilateral inguinal and umbilical hernia with mesh.       Exam  Incisions are clean dry intact abdomen soft mild scrotal ecchymoses

## 2022-02-09 ENCOUNTER — OFFICE VISIT (OUTPATIENT)
Dept: SURGERY | Facility: CLINIC | Age: 52
End: 2022-02-09
Payer: COMMERCIAL

## 2022-02-09 VITALS — BODY MASS INDEX: 33.61 KG/M2 | WEIGHT: 276 LBS | HEIGHT: 76 IN

## 2022-02-09 DIAGNOSIS — R10.30 LOWER ABDOMINAL PAIN: Primary | ICD-10-CM

## 2022-02-09 PROCEDURE — 3008F BODY MASS INDEX DOCD: CPT | Performed by: SURGERY

## 2022-02-09 PROCEDURE — 99213 OFFICE O/P EST LOW 20 MIN: CPT | Performed by: SURGERY

## 2022-02-09 RX ORDER — AMOXICILLIN AND CLAVULANATE POTASSIUM 875; 125 MG/1; MG/1
1 TABLET, FILM COATED ORAL 2 TIMES DAILY
Qty: 14 TABLET | Refills: 0 | Status: SHIPPED | OUTPATIENT
Start: 2022-02-09

## 2022-02-09 NOTE — PROGRESS NOTES
[address] [facility] [address] [facility]    Progress Note    Francis Soria Patient Status:  Specimen    1970 MRN [id number]   Location [facility] Attending No att. providers found   Hosp Day # 0 PCP Lexy Pereira MD, Prasad Dee MD     Assessment and Plan:     Left lower abdominal pain, history of diverticulitis.  -He denies fevers or chills. Eating okay, dark bowel movement yesterday  -? Mild diverticulitis. Start Augmentin 875 twice a day for 1 week. If symptoms do not improve may require imaging. Subjective:   Complains of left lower quadrant pain for the past 3 days similar to his diverticulitis    Objective:     Patient Vitals for the past 24 hrs:   Height Weight   22 1206 6' 4\" (1.93 m) 276 lb (125.2 kg)           Exam: Abdomen is soft slightly tender in the left lower quadrant no rebound or guarding    Results:     Lab Results   Component Value Date    WBC 5.5 2021    HGB 15.9 2021    HCT 44.8 2021    .0 2021    CREATSERUM 1.06 2021    BUN 10 2021     2021    K 3.5 2021     2021    CO2 25.0 2021     (H) 2021    CA 9.3 2021    ALB 3.6 2021    ALKPHO 48 2021    BILT 1.1 2021    TP 7.9 2021    AST 34 2021    ALT 79 (H) 2021    INR 1.19 2021       No results found.             Hayley Navas MD  2022

## 2022-02-09 NOTE — PATIENT INSTRUCTIONS
Start Augmentin 875 mg twice a day for 1 week. Stay on a light diet for the next week avoid hard to digest foods. Make an appointment with gastroenterology to have your colonoscopy. [phone]    Follow-up with me if pain does not improve and you may require a CT scan

## 2023-01-09 ENCOUNTER — LAB ENCOUNTER (OUTPATIENT)
Dept: LAB | Facility: HOSPITAL | Age: 53
End: 2023-01-09
Attending: INTERNAL MEDICINE
Payer: COMMERCIAL

## 2023-01-09 DIAGNOSIS — R03.0 ELEVATED BLOOD PRESSURE READING WITHOUT DIAGNOSIS OF HYPERTENSION: Primary | ICD-10-CM

## 2023-01-09 LAB
ALBUMIN SERPL-MCNC: 4 G/DL (ref 3.4–5)
ALBUMIN/GLOB SERPL: 1.2 {RATIO} (ref 1–2)
ALP LIVER SERPL-CCNC: 50 U/L
ALT SERPL-CCNC: 25 U/L
ANION GAP SERPL CALC-SCNC: 7 MMOL/L (ref 0–18)
AST SERPL-CCNC: 14 U/L (ref 15–37)
BASOPHILS # BLD AUTO: 0.05 X10(3) UL (ref 0–0.2)
BASOPHILS NFR BLD AUTO: 0.8 %
BILIRUB SERPL-MCNC: 0.6 MG/DL (ref 0.1–2)
BUN BLD-MCNC: 14 MG/DL (ref 7–18)
BUN/CREAT SERPL: 12.2 (ref 10–20)
CALCIUM BLD-MCNC: 9 MG/DL (ref 8.5–10.1)
CHLORIDE SERPL-SCNC: 105 MMOL/L (ref 98–112)
CHOLEST SERPL-MCNC: 176 MG/DL (ref ?–200)
CO2 SERPL-SCNC: 27 MMOL/L (ref 21–32)
CREAT BLD-MCNC: 1.15 MG/DL
DEPRECATED RDW RBC AUTO: 36.4 FL (ref 35.1–46.3)
EOSINOPHIL # BLD AUTO: 0.14 X10(3) UL (ref 0–0.7)
EOSINOPHIL NFR BLD AUTO: 2.3 %
ERYTHROCYTE [DISTWIDTH] IN BLOOD BY AUTOMATED COUNT: 11.8 % (ref 11–15)
EST. AVERAGE GLUCOSE BLD GHB EST-MCNC: 105 MG/DL (ref 68–126)
FASTING PATIENT LIPID ANSWER: NO
FASTING STATUS PATIENT QL REPORTED: NO
GFR SERPLBLD BASED ON 1.73 SQ M-ARVRAT: 76 ML/MIN/1.73M2 (ref 60–?)
GLOBULIN PLAS-MCNC: 3.3 G/DL (ref 2.8–4.4)
GLUCOSE BLD-MCNC: 90 MG/DL (ref 70–99)
HBA1C MFR BLD: 5.3 % (ref ?–5.7)
HCT VFR BLD AUTO: 44.2 %
HDLC SERPL-MCNC: 56 MG/DL (ref 40–59)
HGB BLD-MCNC: 15.6 G/DL
IMM GRANULOCYTES # BLD AUTO: 0.02 X10(3) UL (ref 0–1)
IMM GRANULOCYTES NFR BLD: 0.3 %
LDLC SERPL CALC-MCNC: 89 MG/DL (ref ?–100)
LYMPHOCYTES # BLD AUTO: 1.08 X10(3) UL (ref 1–4)
LYMPHOCYTES NFR BLD AUTO: 17.7 %
MCH RBC QN AUTO: 30.1 PG (ref 26–34)
MCHC RBC AUTO-ENTMCNC: 35.3 G/DL (ref 31–37)
MCV RBC AUTO: 85.2 FL
MONOCYTES # BLD AUTO: 0.43 X10(3) UL (ref 0.1–1)
MONOCYTES NFR BLD AUTO: 7 %
NEUTROPHILS # BLD AUTO: 4.39 X10 (3) UL (ref 1.5–7.7)
NEUTROPHILS # BLD AUTO: 4.39 X10(3) UL (ref 1.5–7.7)
NEUTROPHILS NFR BLD AUTO: 71.9 %
NONHDLC SERPL-MCNC: 120 MG/DL (ref ?–130)
OSMOLALITY SERPL CALC.SUM OF ELEC: 288 MOSM/KG (ref 275–295)
PLATELET # BLD AUTO: 270 10(3)UL (ref 150–450)
POTASSIUM SERPL-SCNC: 4.2 MMOL/L (ref 3.5–5.1)
PROT SERPL-MCNC: 7.3 G/DL (ref 6.4–8.2)
RBC # BLD AUTO: 5.19 X10(6)UL
SODIUM SERPL-SCNC: 139 MMOL/L (ref 136–145)
TRIGL SERPL-MCNC: 185 MG/DL (ref 30–149)
TSI SER-ACNC: 1.73 MIU/ML (ref 0.36–3.74)
VIT D+METAB SERPL-MCNC: 9.4 NG/ML (ref 30–100)
VLDLC SERPL CALC-MCNC: 30 MG/DL (ref 0–30)
WBC # BLD AUTO: 6.1 X10(3) UL (ref 4–11)

## 2023-01-09 PROCEDURE — 83036 HEMOGLOBIN GLYCOSYLATED A1C: CPT

## 2023-01-09 PROCEDURE — 80061 LIPID PANEL: CPT

## 2023-01-09 PROCEDURE — 82306 VITAMIN D 25 HYDROXY: CPT

## 2023-01-09 PROCEDURE — 36415 COLL VENOUS BLD VENIPUNCTURE: CPT

## 2023-01-09 PROCEDURE — 80053 COMPREHEN METABOLIC PANEL: CPT

## 2023-01-09 PROCEDURE — 84443 ASSAY THYROID STIM HORMONE: CPT

## 2023-01-09 PROCEDURE — 85025 COMPLETE CBC W/AUTO DIFF WBC: CPT

## 2024-12-09 ENCOUNTER — LAB ENCOUNTER (OUTPATIENT)
Dept: LAB | Facility: HOSPITAL | Age: 54
End: 2024-12-09
Attending: INTERNAL MEDICINE
Payer: COMMERCIAL

## 2024-12-09 DIAGNOSIS — I10 ESSENTIAL HYPERTENSION, MALIGNANT: Primary | ICD-10-CM

## 2024-12-09 DIAGNOSIS — E55.9 AVITAMINOSIS D: ICD-10-CM

## 2024-12-09 LAB
ALBUMIN SERPL-MCNC: 4.5 G/DL (ref 3.2–4.8)
ALBUMIN/GLOB SERPL: 2.3 {RATIO} (ref 1–2)
ALP LIVER SERPL-CCNC: 57 U/L
ALT SERPL-CCNC: 38 U/L
ANION GAP SERPL CALC-SCNC: 7 MMOL/L (ref 0–18)
AST SERPL-CCNC: 32 U/L (ref ?–34)
BASOPHILS # BLD AUTO: 0.03 X10(3) UL (ref 0–0.2)
BASOPHILS NFR BLD AUTO: 0.4 %
BILIRUB SERPL-MCNC: 0.8 MG/DL (ref 0.3–1.2)
BUN BLD-MCNC: 12 MG/DL (ref 9–23)
BUN/CREAT SERPL: 11 (ref 10–20)
CALCIUM BLD-MCNC: 9.9 MG/DL (ref 8.7–10.4)
CHLORIDE SERPL-SCNC: 105 MMOL/L (ref 98–112)
CHOLEST SERPL-MCNC: 192 MG/DL (ref ?–200)
CO2 SERPL-SCNC: 25 MMOL/L (ref 21–32)
CREAT BLD-MCNC: 1.09 MG/DL
DEPRECATED RDW RBC AUTO: 40.6 FL (ref 35.1–46.3)
EGFRCR SERPLBLD CKD-EPI 2021: 80 ML/MIN/1.73M2 (ref 60–?)
EOSINOPHIL # BLD AUTO: 0.18 X10(3) UL (ref 0–0.7)
EOSINOPHIL NFR BLD AUTO: 2.1 %
ERYTHROCYTE [DISTWIDTH] IN BLOOD BY AUTOMATED COUNT: 12.6 % (ref 11–15)
EST. AVERAGE GLUCOSE BLD GHB EST-MCNC: 117 MG/DL (ref 68–126)
FASTING PATIENT LIPID ANSWER: YES
FASTING STATUS PATIENT QL REPORTED: YES
GLOBULIN PLAS-MCNC: 2 G/DL (ref 2–3.5)
GLUCOSE BLD-MCNC: 105 MG/DL (ref 70–99)
HBA1C MFR BLD: 5.7 % (ref ?–5.7)
HCT VFR BLD AUTO: 43.9 %
HDLC SERPL-MCNC: 60 MG/DL (ref 40–59)
HGB BLD-MCNC: 16.1 G/DL
IMM GRANULOCYTES # BLD AUTO: 0.04 X10(3) UL (ref 0–1)
IMM GRANULOCYTES NFR BLD: 0.5 %
LDLC SERPL CALC-MCNC: 91 MG/DL (ref ?–100)
LYMPHOCYTES # BLD AUTO: 0.75 X10(3) UL (ref 1–4)
LYMPHOCYTES NFR BLD AUTO: 8.8 %
MCH RBC QN AUTO: 32.2 PG (ref 26–34)
MCHC RBC AUTO-ENTMCNC: 36.7 G/DL (ref 31–37)
MCV RBC AUTO: 87.8 FL
MONOCYTES # BLD AUTO: 0.53 X10(3) UL (ref 0.1–1)
MONOCYTES NFR BLD AUTO: 6.2 %
NEUTROPHILS # BLD AUTO: 6.98 X10 (3) UL (ref 1.5–7.7)
NEUTROPHILS # BLD AUTO: 6.98 X10(3) UL (ref 1.5–7.7)
NEUTROPHILS NFR BLD AUTO: 82 %
NONHDLC SERPL-MCNC: 132 MG/DL (ref ?–130)
OSMOLALITY SERPL CALC.SUM OF ELEC: 284 MOSM/KG (ref 275–295)
PLATELET # BLD AUTO: 243 10(3)UL (ref 150–450)
POTASSIUM SERPL-SCNC: 4 MMOL/L (ref 3.5–5.1)
PROT SERPL-MCNC: 6.5 G/DL (ref 5.7–8.2)
RBC # BLD AUTO: 5 X10(6)UL
SODIUM SERPL-SCNC: 137 MMOL/L (ref 136–145)
TRIGL SERPL-MCNC: 248 MG/DL (ref 30–149)
TSI SER-ACNC: 1.47 UIU/ML (ref 0.55–4.78)
VIT D+METAB SERPL-MCNC: 6.1 NG/ML (ref 30–100)
VLDLC SERPL CALC-MCNC: 40 MG/DL (ref 0–30)
WBC # BLD AUTO: 8.5 X10(3) UL (ref 4–11)

## 2024-12-09 PROCEDURE — 80061 LIPID PANEL: CPT

## 2024-12-09 PROCEDURE — 83036 HEMOGLOBIN GLYCOSYLATED A1C: CPT

## 2024-12-09 PROCEDURE — 80053 COMPREHEN METABOLIC PANEL: CPT

## 2024-12-09 PROCEDURE — 85025 COMPLETE CBC W/AUTO DIFF WBC: CPT

## 2024-12-09 PROCEDURE — 82306 VITAMIN D 25 HYDROXY: CPT

## 2024-12-09 PROCEDURE — 36415 COLL VENOUS BLD VENIPUNCTURE: CPT

## 2024-12-09 PROCEDURE — 84443 ASSAY THYROID STIM HORMONE: CPT

## 2025-03-08 ENCOUNTER — HOSPITAL ENCOUNTER (OUTPATIENT)
Dept: GENERAL RADIOLOGY | Facility: HOSPITAL | Age: 55
Discharge: HOME | End: 2025-03-08
Attending: INTERNAL MEDICINE
Payer: COMMERCIAL

## 2025-03-08 DIAGNOSIS — R60.9 SWELLING: ICD-10-CM

## 2025-03-08 PROCEDURE — 73560 X-RAY EXAM OF KNEE 1 OR 2: CPT | Performed by: INTERNAL MEDICINE

## (undated) DEVICE — C-ARM: Brand: UNBRANDED

## (undated) DEVICE — ROBOTIC: Brand: MEDLINE INDUSTRIES, INC.

## (undated) DEVICE — DRAPE SHEET LG

## (undated) DEVICE — TROCAR: Brand: KII FIOS FIRST ENTRY

## (undated) DEVICE — SUTURE MONOCRYL 3-0 Y497G

## (undated) DEVICE — DEVICE PORT SITE CLOSURE

## (undated) DEVICE — COLUMN DRAPE

## (undated) DEVICE — INSUFFLATION NEEDLE TO ESTABLISH PNEUMOPERITONEUM.: Brand: INSUFFLATION NEEDLE

## (undated) DEVICE — GAMMEX® PI HYBRID SIZE 7, STERILE POWDER-FREE SURGICAL GLOVE, POLYISOPRENE AND NEOPRENE BLEND: Brand: GAMMEX

## (undated) DEVICE — MEGA NEEDLE DRIVER: Brand: ENDOWRIST

## (undated) DEVICE — FENESTRATED BIPOLAR FORCEPS: Brand: ENDOWRIST

## (undated) DEVICE — SUTURE VLOC 90 2-0 9\" 2145

## (undated) DEVICE — DRAPE SHEET LAPCHOLE 124X100X7

## (undated) DEVICE — TIP COVER ACCESSORY

## (undated) DEVICE — BLADELESS OBTURATOR: Brand: WECK VISTA

## (undated) DEVICE — SUTURE VICRYL 0

## (undated) DEVICE — DEVICE FIX.SECURESTRAP 5MM

## (undated) DEVICE — CANNULA SEAL

## (undated) DEVICE — REDUCER: Brand: ENDOWRIST

## (undated) DEVICE — ARM DRAPE

## (undated) DEVICE — SOL  .9 1000ML BTL

## (undated) DEVICE — MONOPOLAR CURVED SCISSORS: Brand: ENDOWRIST

## (undated) DEVICE — 40580 - THE PINK PAD - ADVANCED TRENDELENBURG POSITIONING KIT: Brand: 40580 - THE PINK PAD - ADVANCED TRENDELENBURG POSITIONING KIT

## (undated) DEVICE — LAPAROVUE VISIBILITY SYSTEM LAPAROSCOPIC SOLUTIONS: Brand: LAPAROVUE

## (undated) DEVICE — SEAL

## (undated) NOTE — LETTER
[address] [place]     I agree to have a Peripherally Inserted Central Catheter (PICC) placed in my arm.    1. The PICC insertion procedure, care, maintenance, risks, benefits, and complications have been explained to me b the PICC, including risks, benefits, and side effects related to the alternatives and risks related to not receiving this procedure.     8.  I have expressed any questions about this procedure to my physician or the PICC Proceduralist and he/she has answere

## (undated) NOTE — LETTER
To Whom It May Concern:    Lucila Camp has been under our care regarding ongoing medical issues. Because of this, he has been required to restrict her physical activities.     He may resume his usual activities, including work, on February 14, 2022 w

## (undated) NOTE — LETTER
12/8/2021              Krystal Beatty        [address] [address]         To whom it may concern: The above patient is under my care.  He is scheduled [facility] on 12- [facility].